# Patient Record
Sex: FEMALE | Race: WHITE | Employment: OTHER | URBAN - METROPOLITAN AREA
[De-identification: names, ages, dates, MRNs, and addresses within clinical notes are randomized per-mention and may not be internally consistent; named-entity substitution may affect disease eponyms.]

---

## 2021-02-12 ENCOUNTER — TRANSCRIBE ORDER (OUTPATIENT)
Dept: REGISTRATION | Age: 76
End: 2021-02-12

## 2021-02-12 ENCOUNTER — HOSPITAL ENCOUNTER (OUTPATIENT)
Dept: PREADMISSION TESTING | Age: 76
Discharge: HOME | End: 2021-02-12
Payer: MEDICARE

## 2021-02-12 DIAGNOSIS — Z01.812 PRE-PROCEDURE LAB EXAM: Primary | ICD-10-CM

## 2021-02-12 DIAGNOSIS — Z01.812 PRE-PROCEDURE LAB EXAM: ICD-10-CM

## 2021-02-12 PROCEDURE — U0003 INFECTIOUS AGENT DETECTION BY NUCLEIC ACID (DNA OR RNA); SEVERE ACUTE RESPIRATORY SYNDROME CORONAVIRUS 2 (SARS-COV-2) (CORONAVIRUS DISEASE [COVID-19]), AMPLIFIED PROBE TECHNIQUE, MAKING USE OF HIGH THROUGHPUT TECHNOLOGIES AS DESCRIBED BY CMS-2020-01-R: HCPCS

## 2021-02-13 LAB — SARS-COV-2, COV2NT: NOT DETECTED

## 2021-02-19 ENCOUNTER — HOSPITAL ENCOUNTER (OUTPATIENT)
Age: 76
Setting detail: OUTPATIENT SURGERY
Discharge: HOME | End: 2021-02-19
Attending: SPECIALIST | Admitting: SPECIALIST
Payer: MEDICARE

## 2021-02-19 ENCOUNTER — ANESTHESIA (OUTPATIENT)
Dept: ENDOSCOPY | Age: 76
End: 2021-02-19
Payer: MEDICARE

## 2021-02-19 ENCOUNTER — ANESTHESIA EVENT (OUTPATIENT)
Dept: ENDOSCOPY | Age: 76
End: 2021-02-19
Payer: MEDICARE

## 2021-02-19 VITALS
HEART RATE: 57 BPM | RESPIRATION RATE: 14 BRPM | TEMPERATURE: 98.7 F | BODY MASS INDEX: 22.08 KG/M2 | SYSTOLIC BLOOD PRESSURE: 128 MMHG | DIASTOLIC BLOOD PRESSURE: 68 MMHG | HEIGHT: 62 IN | OXYGEN SATURATION: 100 % | WEIGHT: 120 LBS

## 2021-02-19 PROCEDURE — 74011250636 HC RX REV CODE- 250/636: Performed by: SPECIALIST

## 2021-02-19 PROCEDURE — 74011000250 HC RX REV CODE- 250: Performed by: NURSE ANESTHETIST, CERTIFIED REGISTERED

## 2021-02-19 PROCEDURE — 77030026438 HC STYL ET INTUB CARD -A: Performed by: ANESTHESIOLOGY

## 2021-02-19 PROCEDURE — 88305 TISSUE EXAM BY PATHOLOGIST: CPT

## 2021-02-19 PROCEDURE — 76040000019: Performed by: SPECIALIST

## 2021-02-19 PROCEDURE — 77030021593 HC FCPS BIOP ENDOSC BSC -A: Performed by: SPECIALIST

## 2021-02-19 PROCEDURE — 74011250636 HC RX REV CODE- 250/636: Performed by: NURSE ANESTHETIST, CERTIFIED REGISTERED

## 2021-02-19 PROCEDURE — 77030008684 HC TU ET CUF COVD -B: Performed by: ANESTHESIOLOGY

## 2021-02-19 PROCEDURE — 76060000031 HC ANESTHESIA FIRST 0.5 HR: Performed by: SPECIALIST

## 2021-02-19 RX ORDER — CALCIUM CARBONATE 500(1250)
TABLET ORAL DAILY
COMMUNITY

## 2021-02-19 RX ORDER — ROSUVASTATIN CALCIUM 20 MG/1
20 TABLET, COATED ORAL
COMMUNITY

## 2021-02-19 RX ORDER — TIMOLOL MALEATE 5 MG/ML
1 SOLUTION OPHTHALMIC DAILY
COMMUNITY
End: 2022-08-02 | Stop reason: DRUGHIGH

## 2021-02-19 RX ORDER — DEXTROMETHORPHAN/PSEUDOEPHED 2.5-7.5/.8
1.2 DROPS ORAL
Status: DISCONTINUED | OUTPATIENT
Start: 2021-02-19 | End: 2021-02-19 | Stop reason: HOSPADM

## 2021-02-19 RX ORDER — SODIUM CHLORIDE 0.9 % (FLUSH) 0.9 %
5-40 SYRINGE (ML) INJECTION AS NEEDED
Status: DISCONTINUED | OUTPATIENT
Start: 2021-02-19 | End: 2021-02-19 | Stop reason: HOSPADM

## 2021-02-19 RX ORDER — FLUMAZENIL 0.1 MG/ML
0.2 INJECTION INTRAVENOUS
Status: DISCONTINUED | OUTPATIENT
Start: 2021-02-19 | End: 2021-02-19 | Stop reason: HOSPADM

## 2021-02-19 RX ORDER — NALOXONE HYDROCHLORIDE 0.4 MG/ML
0.4 INJECTION, SOLUTION INTRAMUSCULAR; INTRAVENOUS; SUBCUTANEOUS
Status: DISCONTINUED | OUTPATIENT
Start: 2021-02-19 | End: 2021-02-19 | Stop reason: HOSPADM

## 2021-02-19 RX ORDER — LATANOPROST 50 UG/ML
1 SOLUTION/ DROPS OPHTHALMIC
COMMUNITY

## 2021-02-19 RX ORDER — SODIUM CHLORIDE 0.9 % (FLUSH) 0.9 %
5-40 SYRINGE (ML) INJECTION EVERY 8 HOURS
Status: DISCONTINUED | OUTPATIENT
Start: 2021-02-19 | End: 2021-02-19 | Stop reason: HOSPADM

## 2021-02-19 RX ORDER — LIDOCAINE HYDROCHLORIDE 20 MG/ML
INJECTION, SOLUTION EPIDURAL; INFILTRATION; INTRACAUDAL; PERINEURAL AS NEEDED
Status: DISCONTINUED | OUTPATIENT
Start: 2021-02-19 | End: 2021-02-19 | Stop reason: HOSPADM

## 2021-02-19 RX ORDER — SODIUM CHLORIDE 9 MG/ML
50 INJECTION, SOLUTION INTRAVENOUS CONTINUOUS
Status: DISCONTINUED | OUTPATIENT
Start: 2021-02-19 | End: 2021-02-19 | Stop reason: HOSPADM

## 2021-02-19 RX ORDER — EPINEPHRINE 0.1 MG/ML
1 INJECTION INTRACARDIAC; INTRAVENOUS
Status: DISCONTINUED | OUTPATIENT
Start: 2021-02-19 | End: 2021-02-19 | Stop reason: HOSPADM

## 2021-02-19 RX ORDER — ATROPINE SULFATE 0.1 MG/ML
0.5 INJECTION INTRAVENOUS
Status: DISCONTINUED | OUTPATIENT
Start: 2021-02-19 | End: 2021-02-19 | Stop reason: HOSPADM

## 2021-02-19 RX ORDER — PROPOFOL 10 MG/ML
INJECTION, EMULSION INTRAVENOUS AS NEEDED
Status: DISCONTINUED | OUTPATIENT
Start: 2021-02-19 | End: 2021-02-19 | Stop reason: HOSPADM

## 2021-02-19 RX ADMIN — PROPOFOL 30 MG: 10 INJECTION, EMULSION INTRAVENOUS at 13:37

## 2021-02-19 RX ADMIN — PROPOFOL 30 MG: 10 INJECTION, EMULSION INTRAVENOUS at 13:31

## 2021-02-19 RX ADMIN — PROPOFOL 100 MG: 10 INJECTION, EMULSION INTRAVENOUS at 13:22

## 2021-02-19 RX ADMIN — PROPOFOL 30 MG: 10 INJECTION, EMULSION INTRAVENOUS at 13:28

## 2021-02-19 RX ADMIN — PROPOFOL 30 MG: 10 INJECTION, EMULSION INTRAVENOUS at 13:25

## 2021-02-19 RX ADMIN — PROPOFOL 30 MG: 10 INJECTION, EMULSION INTRAVENOUS at 13:34

## 2021-02-19 RX ADMIN — LIDOCAINE HYDROCHLORIDE 50 MG: 20 INJECTION, SOLUTION EPIDURAL; INFILTRATION; INTRACAUDAL; PERINEURAL at 13:22

## 2021-02-19 RX ADMIN — PROPOFOL 30 MG: 10 INJECTION, EMULSION INTRAVENOUS at 13:23

## 2021-02-19 NOTE — ROUTINE PROCESS
Gaurang Gupta  1945  [id number]    Situation:  Verbal report received from: Diane  Procedure: Procedure(s):  COLONOSCOPY  COLON BIOPSY    Background:    Preoperative diagnosis: Colon Cancer Screen  Postoperative diagnosis: 1. Transverse Colon Polyp  2.  Hemorrhoids    :  Dr. Tori Ortiz  Assistant(s): Endoscopy Technician-1: Velvet Martin  Endoscopy RN-1: Siddharth Smyth    Specimens:   ID Type Source Tests Collected by Time Destination   1 : Transverse Colon Polyp Preservative Colon, Transverse  Katelyn Kennedy MD 2/19/2021 1333 Pathology     H. Pylori  no    Assessment:  Intra-procedure medications   Anesthesia gave intra-procedure sedation and medications, see anesthesia flow sheet     Intravenous fluids: NS@ KVO     Vital signs stable     Abdominal assessment: round and soft    Recommendation:  Discharge patient per MD order  Family -yes  Permission to share finding with family -yes

## 2021-02-19 NOTE — ANESTHESIA PREPROCEDURE EVALUATION
Relevant Problems   No relevant active problems       Anesthetic History   No history of anesthetic complications            Review of Systems / Medical History  Patient summary reviewed, nursing notes reviewed and pertinent labs reviewed    Pulmonary  Within defined limits                 Neuro/Psych   Within defined limits           Cardiovascular  Within defined limits                Exercise tolerance: >4 METS     GI/Hepatic/Renal  Within defined limits              Endo/Other  Within defined limits           Other Findings              Physical Exam    Airway  Mallampati: II  TM Distance: > 6 cm  Neck ROM: normal range of motion   Mouth opening: Normal     Cardiovascular  Regular rate and rhythm,  S1 and S2 normal,  no murmur, click, rub, or gallop             Dental  No notable dental hx       Pulmonary  Breath sounds clear to auscultation               Abdominal  GI exam deferred       Other Findings            Anesthetic Plan    ASA: 1  Anesthesia type: MAC          Induction: Intravenous  Anesthetic plan and risks discussed with: Patient

## 2021-02-19 NOTE — PROCEDURES
[address], [address] [place] [address]                 Colonoscopy Procedure Note    Indications:   See Preoperative Diagnosis above  Referring Physician: None  Anesthesia/Sedation: MAC anesthesia Propofol  Endoscopist:  Dr. Aracelis Fine  Assistant:  Endoscopy Technician-1: Amanda Moreau IV  Endoscopy RN-1: Brian Moss  Preoperative diagnosis: Colon Cancer Screen  Postoperative diagnosis: 1. Transverse Colon Polyp  2. Hemorrhoids    Procedure in Detail:  Informed consent was obtained for the procedure, including sedation. Risks of perforation, hemorrhage, adverse drug reaction, and aspiration were discussed. The patient was placed in the left lateral decubitus position. Based on the pre-procedure assessment, including review of the patient's medical history, medications, allergies, and review of systems, she had been deemed to be an appropriate candidate for moderate sedation; she was therefore sedated with the medications listed above. The patient was monitored continuously with ECG tracing, pulse oximetry, blood pressure monitoring, and direct observations. A rectal examination was performed. The [id number] was inserted into the rectum and advanced under direct vision to the cecum, which was identified by the ileocecal valve and appendiceal orifice. The quality of the colonic preparation was good. A careful inspection was made as the colonoscope was withdrawn, including a retroflexed view of the rectum; findings and interventions are described below. Appropriate photodocumentation was obtained. Findings:  Rectum: normal  Sigmoid: normal  Descending Colon: normal  Transverse Colon: 3 mm polyp removed with cold biopsy  Ascending Colon: normal  Cecum: normal      Specimens:    colon polyp    EBL: None    Complications: None; patient tolerated the procedure well. Recommendations:     - Await pathology.     - If adenoma is present, repeat colonoscopy in 5 years.        Signed By: Hilary Lara MD                        February 19, 2021

## 2021-02-19 NOTE — H&P
Colonoscopy History and Physical      The patient was seen and examined. Date of last colonoscopy: 2010, Polyps  No      The airway was assessed and documented. The problem list, past medical history, and medications were reviewed. There is no problem list on file for this patient. Social History     Socioeconomic History    Marital status: SINGLE     Spouse name: Not on file    Number of children: Not on file    Years of education: Not on file    Highest education level: Not on file   Occupational History    Not on file   Social Needs    Financial resource strain: Not on file    Food insecurity     Worry: Not on file     Inability: Not on file    Transportation needs     Medical: Not on file     Non-medical: Not on file   Tobacco Use    Smoking status: Never Smoker    Smokeless tobacco: Never Used   Substance and Sexual Activity    Alcohol use: Never     Frequency: Never    Drug use: Never    Sexual activity: Not on file   Lifestyle    Physical activity     Days per week: Not on file     Minutes per session: Not on file    Stress: Not on file   Relationships    Social connections     Talks on phone: Not on file     Gets together: Not on file     Attends [employer] service: Not on file     Active member of club or organization: Not on file     Attends meetings of clubs or organizations: Not on file     Relationship status: Not on file    Intimate partner violence     Fear of current or ex partner: Not on file     Emotionally abused: Not on file     Physically abused: Not on file     Forced sexual activity: Not on file   Other Topics Concern    Not on file   Social History Narrative    Not on file     History reviewed. No pertinent past medical history. Prior to Admission Medications   Prescriptions Last Dose Informant Patient Reported? Taking?   calcium carbonate (OS-EDOUARD) 500 mg calcium (1,250 mg) tablet 2/12/2021 at Unknown time  Yes Yes   Sig: Take  by mouth daily.    latanoprost (XALATAN) 0.005 % ophthalmic solution 2/18/2021 at Unknown time  Yes Yes   Sig: Administer 1 Drop to both eyes nightly. loratadine 10 mg cap 2/18/2021 at Unknown time  Yes Yes   Sig: Take  by mouth. rosuvastatin (CRESTOR) 20 mg tablet 2/18/2021 at Unknown time  Yes Yes   Sig: Take 20 mg by mouth nightly. timolol (TIMOPTIC-XE) 0.5 % ophthalmic gel-forming 2/19/2021 at Unknown time  Yes Yes   Sig: Administer 1 Drop to both eyes daily. Facility-Administered Medications: None       The patient was seen and examined in the endoscopy suite. The airway was assessed and documented. The problem list and medications were reviewed. Chief complaint, history of present illness, and review of systems and Past medical History are positive for: colon cancer screening    The heart, lungs and mental status were satisfactory for the administration of sedation and for the procedure. I discussed with the patient the objectives, risks, consequences and alternatives to the procedure. The patient was counseled at length about the risks of karen Covid-19 in the cheryl-operative and post-operative states including the recovery window of their procedure. The patient was made aware that karen Covid-19 after a surgical procedure may worsen their prognosis for recovering from the virus and lend to a higher morbidity and or mortality risk. The patient was given the options of postponing their procedure. All of the risks, benefits, and alternatives were discussed. The patient does  wish to proceed with the procedure.     Plan: Endoscopic procedure with sedation     Ángel Swann MD   2/19/2021  1:17 PM

## 2021-02-19 NOTE — DISCHARGE INSTRUCTIONS
Mario Thai  [id number]  1945    COLON DISCHARGE INSTRUCTIONS  Discomfort:  Redness at IV site- apply warm compress to area; if redness or soreness persist- contact your physician  There may be a slight amount of blood passed from the rectum  Gaseous discomfort- walking, belching will help relieve any discomfort    DIET:   Regular diet. - however -  remember your colon is empty and a heavy meal will produce gas. Avoid these foods:  vegetables, fried / greasy foods, carbonated drinks for today. You may not drink alcoholic beverages for at least 12 hours    MEDICATIONS:   Regarding Aspirin or Nonsteroidal medications, please see below. ACTIVITY:  You may resume your normal daily activities it is recommended that you spend the remainder of the day resting -  avoid any strenuous activity. You may not operate a vehicle for 12 hours  You may not engage in an occupation involving machinery or appliances for rest of today  Avoid making any critical decisions for at least 24 hour    CALL M.D. ANY SIGN OF:  Increasing pain, nausea, vomiting  Abdominal distension (swelling)  New increased bleeding (oral or rectal)  Fever (chills)  Pain in chest area  Bloody discharge from nose or mouth  Shortness of breath    You may not  take any Advil, Aspirin, Ibuprofen, Motrin, Aleve, or Goodys for 10 days, ONLY  Tylenol as needed for pain. Post procedure diagnosis: 1. Transverse Colon Polyp  2. Hemorrhoids      Follow-up Instructions:   Call Dr. Joe Hall  Results of procedure / biopsy in 10 days  Telephone #  [phone]      DISCHARGE SUMMARY from Nurse    The following personal items collected during your admission are returned to you:   Dental Appliance: Dental Appliances: None  Vision: Visual Aid: None  Hearing Aid:    Jewelry:    Clothing:    Other Valuables:    Valuables sent to safe:      Learning About Coronavirus (COVID-19)  Coronavirus (COVID-19): Overview  What is coronavirus (RINCB-14)?   The coronavirus disease (COVID-19) is caused by a virus. It is an illness that was first found in [place], [place], in December 2019. It has since spread worldwide. The virus can cause fever, cough, and trouble breathing. In severe cases, it can cause pneumonia and make it hard to breathe without help. It can cause death. Coronaviruses are a large group of viruses. They cause the common cold. They also cause more serious illnesses like Middle East respiratory syndrome (MERS) and severe acute respiratory syndrome (SARS). COVID-19 is caused by a novel coronavirus. That means it's a new type that has not been seen in people before. This virus spreads person-to-person through droplets from coughing and sneezing. It can also spread when you are close to someone who is infected. And it can spread when you touch something that has the virus on it, such as a doorknob or a tabletop. What can you do to protect yourself from coronavirus (COVID-19)? The best way to protect yourself from getting sick is to:  · Avoid areas where there is an outbreak. · Avoid contact with people who may be infected. · Wash your hands often with soap or alcohol-based hand sanitizers. · Avoid crowds and try to stay at least 6 feet away from other people. · Wash your hands often, especially after you cough or sneeze. Use soap and water, and scrub for at least 20 seconds. If soap and water aren't available, use an alcohol-based hand . · Avoid touching your mouth, nose, and eyes. What can you do to avoid spreading the virus to others? To help avoid spreading the virus to others:  · Cover your mouth with a tissue when you cough or sneeze. Then throw the tissue in the trash. · Use a disinfectant to clean things that you touch often. · Stay home if you are sick or have been exposed to the virus. Don't go to school, work, or public areas. And don't use public transportation. · If you are sick:  ? Leave your home only if you need to get medical care. But call the doctor's office first so they know you're coming. And wear a face mask, if you have one.  ? If you have a face mask, wear it whenever you're around other people. It can help stop the spread of the virus when you cough or sneeze. ? Clean and disinfect your home every day. Use household  and disinfectant wipes or sprays. Take special care to clean things that you grab with your hands. These include doorknobs, remote controls, phones, and handles on your refrigerator and microwave. And don't forget countertops, tabletops, bathrooms, and computer keyboards. When to call for help  Call 911 anytime you think you may need emergency care. For example, call if:  · You have severe trouble breathing. (You can't talk at all.)  · You have constant chest pain or pressure. · You are severely dizzy or lightheaded. · You are confused or can't think clearly. · Your face and lips have a blue color. · You pass out (lose consciousness) or are very hard to wake up. Call your doctor now if you develop symptoms such as:  · Shortness of breath. · Fever. · Cough. If you need to get care, call ahead to the doctor's office for instructions before you go. Make sure you wear a face mask, if you have one, to prevent exposing other people to the virus. Where can you get the latest information? The following health organizations are tracking and studying this virus. Their websites contain the most up-to-date information. Meir Metz also learn what to do if you think you may have been exposed to the virus. · U.S. Centers for Disease Control and Prevention (CDC): The CDC provides updated news about the disease and travel advice. The website also tells you how to prevent the spread of infection. www.cdc.gov  · World Health Organization [facility]): WHO offers information about the virus outbreaks.  WHO also has travel advice. www.who.int  Current as of: April 1, 2020               Content Version: 12.4  © 5061-0954 [employer], [employer]orporated. Care instructions adapted under license by your healthcare professional. If you have questions about a medical condition or this instruction, always ask your healthcare professional. [address] any warranty or liability for your use of this information.

## 2021-06-03 ENCOUNTER — OFFICE VISIT (OUTPATIENT)
Dept: NEUROLOGY | Age: 76
End: 2021-06-03
Payer: MEDICARE

## 2021-06-03 VITALS
RESPIRATION RATE: 16 BRPM | DIASTOLIC BLOOD PRESSURE: 60 MMHG | HEART RATE: 64 BPM | BODY MASS INDEX: 20.52 KG/M2 | SYSTOLIC BLOOD PRESSURE: 138 MMHG | HEIGHT: 62 IN | OXYGEN SATURATION: 94 % | WEIGHT: 111.5 LBS

## 2021-06-03 DIAGNOSIS — G45.9 TIA (TRANSIENT ISCHEMIC ATTACK): Primary | ICD-10-CM

## 2021-06-03 PROCEDURE — G8432 DEP SCR NOT DOC, RNG: HCPCS | Performed by: SPECIALIST

## 2021-06-03 PROCEDURE — G8427 DOCREV CUR MEDS BY ELIG CLIN: HCPCS | Performed by: SPECIALIST

## 2021-06-03 PROCEDURE — 3017F COLORECTAL CA SCREEN DOC REV: CPT | Performed by: SPECIALIST

## 2021-06-03 PROCEDURE — G8420 CALC BMI NORM PARAMETERS: HCPCS | Performed by: SPECIALIST

## 2021-06-03 PROCEDURE — G8536 NO DOC ELDER MAL SCRN: HCPCS | Performed by: SPECIALIST

## 2021-06-03 PROCEDURE — 1090F PRES/ABSN URINE INCON ASSESS: CPT | Performed by: SPECIALIST

## 2021-06-03 PROCEDURE — 1101F PT FALLS ASSESS-DOCD LE1/YR: CPT | Performed by: SPECIALIST

## 2021-06-03 PROCEDURE — 99204 OFFICE O/P NEW MOD 45 MIN: CPT | Performed by: SPECIALIST

## 2021-06-03 PROCEDURE — G8400 PT W/DXA NO RESULTS DOC: HCPCS | Performed by: SPECIALIST

## 2021-06-03 RX ORDER — ASPIRIN 81 MG/1
TABLET ORAL DAILY
COMMUNITY

## 2021-06-03 RX ORDER — ALENDRONATE SODIUM 70 MG/1
TABLET ORAL
COMMUNITY

## 2021-06-03 RX ORDER — BISMUTH SUBSALICYLATE 262 MG
1 TABLET,CHEWABLE ORAL DAILY
COMMUNITY

## 2021-06-03 NOTE — PROGRESS NOTES
Ms. Juarez Laser presents today to follow up two episodes of visual disturbance. Depression screening done on this patient.

## 2021-06-03 NOTE — PROGRESS NOTES
Neurology Consult      Subjective:      Kavin Peña is a 70 y.o. female who comes in with daughter today. An interesting story line that includes 2 different occasions of sudden impairment of cognition and/or vision etc.  1 night was at dinner with friends and had difficulty reading the menu and felt cognitively detached. This lasted for 5 or 7 minutes and cleared. Had no occasion of headache at that time although she has had milder headaches in the past.  Nothing that would apparently equate to a migraine for what it is worth. Then days later was at home watching TV and suddenly saw a round [place] that was not transparent a could not see through it. Did not do an independent eye cover and after 2 to 3 minutes it cleared. Again no concomitant headache or other symptoms that she can recall and has done well since. Saw Dr. Linda Marsh the ophthalmologist and apparently turned in a normal exam.  Has been on baby aspirin but notices some bruisability so takes it every other day. No background history of hypertension diabetes organic heart disease does not smoke and consumes alcohol occasionally. Is on Crestor for I presume hyperlipidemia. Current Outpatient Medications   Medication Sig Dispense Refill    multivitamin (ONE A DAY) tablet Take 1 Tablet by mouth daily.  aspirin delayed-release 81 mg tablet Take  by mouth daily.  alendronate (FOSAMAX) 70 mg tablet Take  by mouth.  timolol (TIMOPTIC-XE) 0.5 % ophthalmic gel-forming Administer 1 Drop to both eyes daily.  rosuvastatin (CRESTOR) 20 mg tablet Take 20 mg by mouth nightly.  loratadine 10 mg cap Take  by mouth.  latanoprost (XALATAN) 0.005 % ophthalmic solution Administer 1 Drop to both eyes nightly.  calcium carbonate (OS-EDOUARD) 500 mg calcium (1,250 mg) tablet Take  by mouth daily. No Known Allergies  History reviewed. No pertinent past medical history.    Past Surgical History:   Procedure Laterality Date    COLONOSCOPY Left 2/19/2021    COLONOSCOPY performed by [facility]ika Madrigal MD at [facility] ENDOSCOPY    HX HYSTERECTOMY        Social History     Socioeconomic History    Marital status: SINGLE     Spouse name: Not on file    Number of children: Not on file    Years of education: Not on file    Highest education level: Not on file   Occupational History    Not on file   Tobacco Use    Smoking status: Never Smoker    Smokeless tobacco: Never Used   Substance and Sexual Activity    Alcohol use: Never    Drug use: Never    Sexual activity: Not on file   Other Topics Concern    Not on file   Social History Narrative    Not on file     Social Determinants of Health     Financial Resource Strain:     Difficulty of Paying Living Expenses:    Food Insecurity:     Worried About Running Out of Food in the Last Year:     [address] in the Last Year:    Transportation Needs:     Lack of Transportation (Medical):  Lack of Transportation (Non-Medical):    Physical Activity:     Days of Exercise per Week:     Minutes of Exercise per Session:    Stress:     Feeling of Stress :    Social Connections:     Frequency of Communication with Friends and Family:     Frequency of Social Gatherings with Friends and Family:     Attends Congregation Services:     Active Member of Clubs or Organizations:     Attends Club or Organization Meetings:     Marital Status:    Intimate Partner Violence:     Fear of Current or Ex-Partner:     Emotionally Abused:     Physically Abused:     Sexually Abused:       History reviewed. No pertinent family history. Visit Vitals  /60   Pulse 64   Resp 16   Ht 5' 2\" (1.575 m)   Wt 50.6 kg (111 lb 8 oz)   SpO2 94%   BMI 20.39 kg/m²        Review of Systems:   A comprehensive review of systems was negative except for that written in the HPI. Neuro Exam:     Appearance:   The patient is well developed, well nourished, provides a coherent history and is in no acute distress. Mental Status: Oriented to time, place and person. Mood and affect appropriate. Cranial Nerves:   Intact visual fields to static hand confrontation. Fundi are benign. BELLO, EOM's full, no nystagmus, no ptosis. Facial sensation is normal. Corneal reflexes are intact. Facial movement is symmetric. Hearing is normal bilaterally. Palate is midline with normal sternocleidomastoid and trapezius muscles are normal. Tongue is midline. Visual acuity near with correction 20/20 OU APD negative   Motor:  5/5 strength in upper and lower proximal and distal muscles. Normal bulk and tone. No fasciculations. Reflexes:   Deep tendon reflexes 2+/4 and symmetrical.   Sensory:   Normal to touch, pinprick and vibration. Gait:  Normal gait. Tremor:   No tremor noted. Cerebellar:  No cerebellar signs present. Neurovascular:  Normal heart sounds and regular rhythm, peripheral pulses intact, and no carotid bruits. Palpation of superficial temporal arteries objectively normal and subjectively nontender            Assessment:   TIA. I do have concerns about these 2 separate events and their implications as might go to an embolic source either cardiac or intrinsic to the vessels. Has been told if she has another one event she should seriously consider going to the ER. In the occasion she has a visual impairment if she is safe to do so, an independent eye cover and determine whether it is 1 or both eyes involved, that would be helpful. Has already seen the ophthalmologist and that helps tremendously. Continue the aspirin and further suggestions could follow. Testing will include select labs MRA of head and neck brain MRI and echocardiogram.      Plan:   Revisit in about 5 weeks or so.   Signed by :  Piotr Parry MD

## 2021-06-03 NOTE — PATIENT INSTRUCTIONS
PRESCRIPTION REFILL POLICY [facility] Statement to Patients April 1, 2014 In an effort to ensure the large volume of patient prescription refills is processed in the most efficient and expeditious manner, we are asking our patients to assist us by calling your Pharmacy for all prescription refills, this will include also your  Mail Order Pharmacy. The pharmacy will contact our office electronically to continue the refill process. Please do not wait until the last minute to call your pharmacy. We need at least 48 hours (2days) to fill prescriptions. We also encourage you to call your pharmacy before going to  your prescription to make sure it is ready. With regard to controlled substance prescription refill requests (narcotic refills) that need to be picked up at our office, we ask your cooperation by providing us with at least 72 hours (3days) notice that you will need a refill. We will not refill narcotic prescription refill requests after 4:00pm on any weekday, Monday through Thursday, or after 2:00pm on Fridays, or on the weekends. We encourage everyone to explore another way of getting your prescription refill request processed using [employer], our patient web portal through our electronic medical record system. [employer] is an efficient and effective way to communicate your medication request directly to the office and  downloadable as an vivian on your smart phone . [employer] also features a review functionality that allows you to view your medication list as well as leave messages for your physician. Are you ready to get connected? If so please review the attatched instructions or speak to any of our staff to get you set up right away! Thank you so much for your cooperation. Should you have any questions please contact our Practice Administrator. The Physicians and Staff,  [facility] Patient history reviewed patient examined.   Agree with overall concerns and to that end would recommend continuation of aspirin and if another episode occurred would seriously consider an ER visitation. Went over the occasion of any visual happenings and doing an independent eye cover and timing the event. Will order testing that reflects concerns for possible TIA like expression and will catch up in the near future. I am glad to see that the eye doctor his already evaluated and that helps a lot.

## 2021-06-04 LAB
CRP SERPL HS-MCNC: 0.37 MG/L (ref 0–3)
ERYTHROCYTE [SEDIMENTATION RATE] IN BLOOD BY WESTERGREN METHOD: 11 MM/HR (ref 0–40)

## 2021-06-17 ENCOUNTER — APPOINTMENT (OUTPATIENT)
Dept: MRI IMAGING | Age: 76
End: 2021-06-17
Payer: MEDICARE

## 2021-06-17 ENCOUNTER — HOSPITAL ENCOUNTER (OUTPATIENT)
Dept: MRI IMAGING | Age: 76
Discharge: HOME | End: 2021-06-17
Payer: MEDICARE

## 2021-06-17 ENCOUNTER — HOSPITAL ENCOUNTER (OUTPATIENT)
Dept: NON INVASIVE DIAGNOSTICS | Age: 76
Discharge: HOME | End: 2021-06-17
Payer: MEDICARE

## 2021-06-17 VITALS
WEIGHT: 111.55 LBS | DIASTOLIC BLOOD PRESSURE: 78 MMHG | BODY MASS INDEX: 20.53 KG/M2 | HEIGHT: 62 IN | SYSTOLIC BLOOD PRESSURE: 118 MMHG

## 2021-06-17 DIAGNOSIS — G45.9 TIA (TRANSIENT ISCHEMIC ATTACK): ICD-10-CM

## 2021-06-17 LAB
ECHO AO ROOT DIAM: 2.9 CM
ECHO AR MAX VEL PISA: 428 CM/S
ECHO AV PEAK GRADIENT: 11 MMHG
ECHO AV PEAK VELOCITY: 165 CM/S
ECHO AV REGURGITANT PHT: 645 MS
ECHO EST RA PRESSURE: 3 MMHG
ECHO LA AREA 4C: 10.57 CM2
ECHO LA MAJOR AXIS: 2.8 CM
ECHO LA MINOR AXIS: 1.88 CM
ECHO LV E' SEPTAL VELOCITY: 9.75 CM/S
ECHO LV EDV A2C: 82.9 CM3
ECHO LV EJECTION FRACTION BIPLANE: 67.9 % (ref 55–100)
ECHO LV ESV A2C: 20.1 CM3
ECHO LV INTERNAL DIMENSION DIASTOLIC: 4.36 CM (ref 3.9–5.3)
ECHO LV INTERNAL DIMENSION SYSTOLIC: 2.72 CM
ECHO LV IVSD: 0.97 CM (ref 0.6–0.9)
ECHO LV MASS 2D: 128 G (ref 67–162)
ECHO LV MASS INDEX 2D: 85.9 G/M2 (ref 43–95)
ECHO LV POSTERIOR WALL DIASTOLIC: 0.85 CM (ref 0.6–0.9)
ECHO LVOT PEAK GRADIENT: 8 MMHG
ECHO LVOT PEAK VELOCITY: 138 CM/S
ECHO MV A VELOCITY: 113 CM/S
ECHO MV AREA PHT: 4.78 CM2
ECHO MV E DECELERATION TIME (DT): 268 MS
ECHO MV E VELOCITY: 100 CM/S
ECHO MV E/A RATIO: 0.88
ECHO MV E/E' SEPTAL: 10.26
ECHO MV PRESSURE HALF TIME (PHT): 46 MS
ECHO PV MAX VELOCITY: 103 CM/S
ECHO PV PEAK INSTANTANEOUS GRADIENT SYSTOLIC: 4 MMHG
ECHO PVEIN A DURATION: 70 MS
ECHO PVEIN A VELOCITY: 36.2 CM/S
ECHO RA AREA 4C: 10.2 CM2
ECHO RIGHT VENTRICULAR SYSTOLIC PRESSURE (RVSP): 22 MMHG
ECHO RV INTERNAL DIMENSION: 2.24 CM
ECHO TV MAX VELOCITY: 219 CM/S
ECHO TV REGURGITANT PEAK GRADIENT: 19 MMHG
MV DEC SLOPE: 7030 MM/S2
MV DEC SLOPE: 7030 MM/S2

## 2021-06-17 PROCEDURE — 96374 THER/PROPH/DIAG INJ IV PUSH: CPT

## 2021-06-17 PROCEDURE — 70544 MR ANGIOGRAPHY HEAD W/O DYE: CPT

## 2021-06-17 PROCEDURE — 70551 MRI BRAIN STEM W/O DYE: CPT

## 2021-06-17 PROCEDURE — 70547 MR ANGIOGRAPHY NECK W/O DYE: CPT

## 2021-07-15 ENCOUNTER — OFFICE VISIT (OUTPATIENT)
Dept: NEUROLOGY | Age: 76
End: 2021-07-15
Payer: MEDICARE

## 2021-07-15 DIAGNOSIS — G45.9 TIA (TRANSIENT ISCHEMIC ATTACK): Primary | ICD-10-CM

## 2021-07-15 PROCEDURE — 1101F PT FALLS ASSESS-DOCD LE1/YR: CPT | Performed by: SPECIALIST

## 2021-07-15 PROCEDURE — G8400 PT W/DXA NO RESULTS DOC: HCPCS | Performed by: SPECIALIST

## 2021-07-15 PROCEDURE — 3017F COLORECTAL CA SCREEN DOC REV: CPT | Performed by: SPECIALIST

## 2021-07-15 PROCEDURE — G8420 CALC BMI NORM PARAMETERS: HCPCS | Performed by: SPECIALIST

## 2021-07-15 PROCEDURE — G8427 DOCREV CUR MEDS BY ELIG CLIN: HCPCS | Performed by: SPECIALIST

## 2021-07-15 PROCEDURE — G8536 NO DOC ELDER MAL SCRN: HCPCS | Performed by: SPECIALIST

## 2021-07-15 PROCEDURE — G8432 DEP SCR NOT DOC, RNG: HCPCS | Performed by: SPECIALIST

## 2021-07-15 PROCEDURE — 99213 OFFICE O/P EST LOW 20 MIN: CPT | Performed by: SPECIALIST

## 2021-07-15 PROCEDURE — 1090F PRES/ABSN URINE INCON ASSESS: CPT | Performed by: SPECIALIST

## 2021-07-15 NOTE — LETTER
7/15/2021    Patient: Fito Mcdonough   YOB: 1945   Date of Visit: 7/15/2021     Ran Walls, [address] [facility] [address]  Via Fax: [phone]    Dear Ran Walls DO,      Thank you for referring Ms. Titi Poon to [facility] for evaluation. My notes for this consultation are attached. If you have questions, please do not hesitate to call me. I look forward to following your patient along with you.       Sincerely,    Akhil Swann MD

## 2021-07-15 NOTE — PROGRESS NOTES
Neurology Consult      Subjective:      Liane Beard is a 70 y.o. female who comes in today with family and this is the second visit work-up of suspect TIA. By way of recall on the first visit there was some concerning history that included some altered mentation and acute confusion at a dinner gathering and later a visual happening of transient nature while watching TV. The work-up that followed included a normal sed rate and CRP normal brain MRA and MRA of the neck as well. Brain MRI showed mild white matter changes and paranasal congestion only. The echocardiogram showed an ejection fraction of 68% and mild aortic mitral and tricuspid regurgitation and with agitated saline no atrial septal defect demonstrated. Patient was very intrigued by the different test she was put through and is even happier now to hear the results. I would suggest that she continue on the every other day baby aspirin to her tolerance and continue follow-ups with primary care as she is on Crestor etc.  Knows where to find me if something changes but as of today does not reference any new difficulties or concerns. Current Outpatient Medications   Medication Sig Dispense Refill    multivitamin (ONE A DAY) tablet Take 1 Tablet by mouth daily.  aspirin delayed-release 81 mg tablet Take  by mouth daily.  alendronate (FOSAMAX) 70 mg tablet Take  by mouth.  timolol (TIMOPTIC-XE) 0.5 % ophthalmic gel-forming Administer 1 Drop to both eyes daily.  rosuvastatin (CRESTOR) 20 mg tablet Take 20 mg by mouth nightly.  loratadine 10 mg cap Take  by mouth.  latanoprost (XALATAN) 0.005 % ophthalmic solution Administer 1 Drop to both eyes nightly.  calcium carbonate (OS-EDOUARD) 500 mg calcium (1,250 mg) tablet Take  by mouth daily. No Known Allergies  No past medical history on file.    Past Surgical History:   Procedure Laterality Date    COLONOSCOPY Left 2/19/2021    COLONOSCOPY performed by Faby Nation Kamini Solis MD at [facility] ENDOSCOPY    HX HYSTERECTOMY        Social History     Socioeconomic History    Marital status: SINGLE     Spouse name: Not on file    Number of children: Not on file    Years of education: Not on file    Highest education level: Not on file   Occupational History    Not on file   Tobacco Use    Smoking status: Never Smoker    Smokeless tobacco: Never Used   Substance and Sexual Activity    Alcohol use: Never    Drug use: Never    Sexual activity: Not on file   Other Topics Concern    Not on file   Social History Narrative    Not on file     Social Determinants of Health     Financial Resource Strain:     Difficulty of Paying Living Expenses:    Food Insecurity:     Worried About Running Out of Food in the Last Year:     [address] in the Last Year:    Transportation Needs:     Lack of Transportation (Medical):  Lack of Transportation (Non-Medical):    Physical Activity:     Days of Exercise per Week:     Minutes of Exercise per Session:    Stress:     Feeling of Stress :    Social Connections:     Frequency of Communication with Friends and Family:     Frequency of Social Gatherings with Friends and Family:     Attends Protestant Services:     Active Member of Clubs or Organizations:     Attends Club or Organization Meetings:     Marital Status:    Intimate Partner Violence:     Fear of Current or Ex-Partner:     Emotionally Abused:     Physically Abused:     Sexually Abused:       No family history on file. There were no vitals taken for this visit. Review of Systems:   A comprehensive review of systems was negative except for that written in the HPI. Neuro Exam:     Appearance: The patient is well developed, well nourished, provides a coherent history and is in no acute distress. Mental Status: Oriented to time, place and person. Mood and affect appropriate. Cranial Nerves:   Intact visual fields. Fundi are benign.  BELLO, EOM's full, no nystagmus, no ptosis. Facial sensation is normal. Corneal reflexes are intact. Facial movement is symmetric. Hearing is normal bilaterally. Palate is midline with normal sternocleidomastoid and trapezius muscles are normal. Tongue is midline. Motor:  5/5 strength in upper and lower proximal and distal muscles. Normal bulk and tone. No fasciculations. Reflexes:   Deep tendon reflexes 2+/4 and symmetrical.   Sensory:   Normal to touch, pinprick and vibration. Gait:  Normal gait. Tremor:   No tremor noted. Cerebellar:  No cerebellar signs present. Neurovascular:  Normal heart sounds and regular rhythm, peripheral pulses intact, and no carotid bruits. Assessment:   Suspect TIA. Glad to see the work-up came out so clean. As far as I am concerned I do not know how we could have avoided the work-up with the preceding history that was obtained on her first visit. In any event should continue with her every other day baby aspirin and follow-up with primary care. Continue with her statin and these tests stand as an important reference point and benchmark for any comparative studies in the future. Is welcome back as needed. Good luck. Plan:   Revisit as needed.   Signed by :  Poncho Tan MD none

## 2021-07-15 NOTE — PATIENT INSTRUCTIONS
Patient worked up for possible TIA expression as outlined on first visit. Collective testing looks fantastic and nothing to worry her about. Out of principal would suggest she continue on her every other day baby aspirin as prior to my work-up. Follow-up with primary care and should do well and these are important test to reference for any future purposes of investigation. Good luck.

## 2022-08-02 ENCOUNTER — OFFICE VISIT (OUTPATIENT)
Dept: ENDOCRINOLOGY | Age: 77
End: 2022-08-02
Payer: MEDICARE

## 2022-08-02 VITALS
BODY MASS INDEX: 21.53 KG/M2 | HEIGHT: 62 IN | DIASTOLIC BLOOD PRESSURE: 70 MMHG | SYSTOLIC BLOOD PRESSURE: 153 MMHG | OXYGEN SATURATION: 99 % | TEMPERATURE: 97.2 F | WEIGHT: 117 LBS | HEART RATE: 67 BPM

## 2022-08-02 DIAGNOSIS — E04.2 MULTINODULAR GOITER: Primary | ICD-10-CM

## 2022-08-02 DIAGNOSIS — M19.012 ARTHRITIS OF LEFT STERNOCLAVICULAR JOINT: ICD-10-CM

## 2022-08-02 PROBLEM — M85.80 OSTEOPENIA: Status: ACTIVE | Noted: 2021-04-16

## 2022-08-02 PROBLEM — H40.9 GLAUCOMA: Status: ACTIVE | Noted: 2021-01-04

## 2022-08-02 PROBLEM — E78.5 HYPERLIPIDEMIA: Status: ACTIVE | Noted: 2021-01-04

## 2022-08-02 PROCEDURE — 1101F PT FALLS ASSESS-DOCD LE1/YR: CPT | Performed by: INTERNAL MEDICINE

## 2022-08-02 PROCEDURE — G8420 CALC BMI NORM PARAMETERS: HCPCS | Performed by: INTERNAL MEDICINE

## 2022-08-02 PROCEDURE — G8427 DOCREV CUR MEDS BY ELIG CLIN: HCPCS | Performed by: INTERNAL MEDICINE

## 2022-08-02 PROCEDURE — G8400 PT W/DXA NO RESULTS DOC: HCPCS | Performed by: INTERNAL MEDICINE

## 2022-08-02 PROCEDURE — G8510 SCR DEP NEG, NO PLAN REQD: HCPCS | Performed by: INTERNAL MEDICINE

## 2022-08-02 PROCEDURE — 99204 OFFICE O/P NEW MOD 45 MIN: CPT | Performed by: INTERNAL MEDICINE

## 2022-08-02 PROCEDURE — 1090F PRES/ABSN URINE INCON ASSESS: CPT | Performed by: INTERNAL MEDICINE

## 2022-08-02 PROCEDURE — 1123F ACP DISCUSS/DSCN MKR DOCD: CPT | Performed by: INTERNAL MEDICINE

## 2022-08-02 PROCEDURE — G8536 NO DOC ELDER MAL SCRN: HCPCS | Performed by: INTERNAL MEDICINE

## 2022-08-02 PROCEDURE — 3017F COLORECTAL CA SCREEN DOC REV: CPT | Performed by: INTERNAL MEDICINE

## 2022-08-02 RX ORDER — FLUTICASONE PROPIONATE 50 MCG
SPRAY, SUSPENSION (ML) NASAL
COMMUNITY

## 2022-08-02 RX ORDER — TIMOLOL MALEATE 5 MG/ML
SOLUTION/ DROPS OPHTHALMIC
COMMUNITY
Start: 2022-06-20

## 2022-08-02 NOTE — PROGRESS NOTES
Arabella Troy MD         Patient Information  Date:8/2/2022  Name : Gonzalo Rios 78 y.o.     YOB: 1945         Referred by: Hilton Chirinos DO         Chief Complaint   Patient presents with    New Patient    Thyroid Problem       History of present illness    Gonzalo Rios is a 78 y.o. female  here for evaluation of thyroid nodule. She noticed left lower  neck swelling, subsequently had thyroid ultrasound which showed multinodular goiter    No dysphagia,dysphonia or dyspnea. No constipation or cold intolerance/heat intolerance  No history of known radiation exposure  No FH of thyroid cancer     Past Medical History:   Diagnosis Date    Hyperlipidemia        Current Outpatient Medications   Medication Sig    fluticasone propionate (FLONASE) 50 mcg/actuation nasal spray fluticasone propionate 50 mcg/actuation nasal spray,suspension   USE 1 SPRAY(S) IN EACH NOSTRIL ONCE DAILY WITH MEALS    timolol (TIMOPTIC) 0.5 % ophthalmic solution INSTILL 1 DROP INTO EACH EYE IN THE MORNING FOR 90 DAYS    multivitamin (ONE A DAY) tablet Take 1 Tablet by mouth daily. aspirin delayed-release 81 mg tablet Take  by mouth daily. alendronate (FOSAMAX) 70 mg tablet Take  by mouth. rosuvastatin (CRESTOR) 20 mg tablet Take 20 mg by mouth nightly. loratadine 10 mg cap Take  by mouth.    latanoprost (XALATAN) 0.005 % ophthalmic solution Administer 1 Drop to both eyes nightly. calcium carbonate (OS-EDOUARD) 500 mg calcium (1,250 mg) tablet Take  by mouth daily. No current facility-administered medications for this visit. Review of Systems:  Per HPI     Physical Examination:  Blood pressure (!) 153/70, pulse 67, temperature 97.2 °F (36.2 °C), temperature source Temporal, height 5' 2\" (1.575 m), weight 117 lb (53.1 kg), SpO2 99 %. Body mass index is 21.4 kg/m².   General: pleasant, no distress, good eye contact  HEENT: no exopthalmos, no periorbital edema, no scleral/conjunctival injection, EOMI, no lid lag or stare  Neck: supple, no thyromegaly, nodules, lymph nodes, she points to left sterno clavicular joint which is prominent, swollen, nontender  Cardiovascular: regular,  normal S1 and S2, no murmurs  Respiratory: clear to auscultation bilaterally  Musculoskeletal: no proximal muscle weakness in upper or lower extremities  Integumentary: no tremors, no edema  Neurological: alert and oriented   Psychiatric: normal mood and affect  Skin - normal turgor    Data Reviewed:   Reviewed PCPs notes, TSH normal 2022  Reviewed ultrasound thyroid  [x] Reviewed labs    Assessment/Plan:     Multinodular goiter    Ultrasound 07/11/2022: Right lobe 4.8 x 1.5 x 1.4 cm, left lobe 4.1 x 1.3 x 1.5 cm, isthmus 0.3 cm    Upper pole right lobe 0.9 x 0.6 x 0.9 isoechoic nodule, few tiny cysts  Left upper lobe 1.8 x 1.2 x 0.7 cm heterogeneously isoechoic solid nodule, lower pole 0.7 x 0.4 x 0.5 cm. No mention of vascularity or microcalcifications. 2022: TSH normal  She is euthyroid clinically and has no compressive symptoms. Patient actually felt was sternoclavicular joint which is swollen. We discussed the natural history of thyroid nodules,most of the nodules are benign and a small percentage ( 5%) can be malignant . Discussed about monitoring versus FNA, risks of thyroid cancer low, she wants to proceed with FNA        Left sternoclavicular joint arthritis / bursitis:    Thank you for allowing me to participate in the care of this patient.     Luis Rodriguez MD

## 2022-08-02 NOTE — LETTER
8/6/2022    Patient: Reagan Aragon   YOB: 1945   Date of Visit: 8/2/2022     Khurram Parsons, [address] [place] [id number]  Via Fax: [phone]     German Thomas, [address] [place] [phone]  Via Fax: [phone]    Dear Khurram Parsons, [address] [place] [address], DO,      Thank you for referring Ms. Trisha Lockhart to [facility] DIABETES & [facility]OCRINOLOGY for evaluation. My notes for this consultation are attached. If you have questions, please do not hesitate to call me. I look forward to following your patient along with you.       Sincerely,    Adilson Call MD

## 2022-08-02 NOTE — PROGRESS NOTES
Identified pt with two pt identifiers. Reviewed record in preparation for visit and have obtained necessary documentation. All patient medications has been reviewed. Chief Complaint   Patient presents with    New Patient    Thyroid Problem     Additional information about chief complaint:    Visit Vitals  BP (!) 153/70 (BP 1 Location: Left upper arm, BP Patient Position: Sitting, BP Cuff Size: Adult)   Pulse 67   Temp 97.2 °F (36.2 °C) (Temporal)   Ht 5' 2\" (1.575 m)   Wt 117 lb (53.1 kg)   SpO2 99%   BMI 21.40 kg/m²       Health Maintenance Due   Topic    Hepatitis C Screening     Depression Screen     COVID-19 Vaccine (1)    Lipid Screen     DTaP/Tdap/Td series (1 - Tdap)    Shingrix Vaccine Age 50> (1 of 2)    Breast Cancer Screen Mammogram     Bone Densitometry (Dexa) Screening     Medicare Yearly Exam        1. Have you been to the ER, urgent care clinic since your last visit? Hospitalized since your last visit? N/a    2. Have you seen or consulted any other health care providers outside of the [address] [place] [address] since your last visit? Include any pap smears or colon screening.  N/a

## 2022-08-30 ENCOUNTER — OFFICE VISIT (OUTPATIENT)
Dept: ENDOCRINOLOGY | Age: 77
End: 2022-08-30
Payer: MEDICARE

## 2022-08-30 VITALS
SYSTOLIC BLOOD PRESSURE: 133 MMHG | HEIGHT: 62 IN | HEART RATE: 75 BPM | OXYGEN SATURATION: 99 % | BODY MASS INDEX: 21.49 KG/M2 | DIASTOLIC BLOOD PRESSURE: 71 MMHG | TEMPERATURE: 97.9 F | WEIGHT: 116.8 LBS

## 2022-08-30 DIAGNOSIS — E04.2 MULTINODULAR GOITER: Primary | ICD-10-CM

## 2022-08-30 PROCEDURE — G8420 CALC BMI NORM PARAMETERS: HCPCS | Performed by: INTERNAL MEDICINE

## 2022-08-30 PROCEDURE — 99212 OFFICE O/P EST SF 10 MIN: CPT | Performed by: INTERNAL MEDICINE

## 2022-08-30 PROCEDURE — G8400 PT W/DXA NO RESULTS DOC: HCPCS | Performed by: INTERNAL MEDICINE

## 2022-08-30 PROCEDURE — G8536 NO DOC ELDER MAL SCRN: HCPCS | Performed by: INTERNAL MEDICINE

## 2022-08-30 PROCEDURE — 10005 FNA BX W/US GDN 1ST LES: CPT | Performed by: INTERNAL MEDICINE

## 2022-08-30 PROCEDURE — G8427 DOCREV CUR MEDS BY ELIG CLIN: HCPCS | Performed by: INTERNAL MEDICINE

## 2022-08-30 PROCEDURE — G8510 SCR DEP NEG, NO PLAN REQD: HCPCS | Performed by: INTERNAL MEDICINE

## 2022-08-30 PROCEDURE — 1090F PRES/ABSN URINE INCON ASSESS: CPT | Performed by: INTERNAL MEDICINE

## 2022-08-30 PROCEDURE — 3017F COLORECTAL CA SCREEN DOC REV: CPT | Performed by: INTERNAL MEDICINE

## 2022-08-30 PROCEDURE — 1123F ACP DISCUSS/DSCN MKR DOCD: CPT | Performed by: INTERNAL MEDICINE

## 2022-08-30 PROCEDURE — 1101F PT FALLS ASSESS-DOCD LE1/YR: CPT | Performed by: INTERNAL MEDICINE

## 2022-08-30 NOTE — PROGRESS NOTES
[facility] DIABETES & ENDOCRINOLOGY  OFFICE PROCEDURE PROGRESS NOTE        Chart reviewed for the following:   Fausto Gooden MD, have reviewed the History, Physical and updated the Allergic reactions for Robson Slade     Procedure performed by:  Nara Albert MD    Assisted by:Fabienne Calderon LPN/Mercy Campbell LPN    TIME OUT performed immediately prior to start of procedure:   Fausto Gooden MD, have performed the following reviews on Robson Slade prior to the start of the procedure:            * Patient was identified by name and date of birth   * Agreement on procedure being performed was verified  * Explained procedure and answered questions  * Procedure site verified and marked as necessary  * Patient was positioned for comfort  * Consent was signed and verified      The risks, benefits and alternatives of ultrasound guided thyroid fine   needle aspiration were discussed with the patient. After all questions were   answered and informed written consent was obtained, patient was prepped  in a supine position. 1% lidocaine was used as local anesthetic. Using sonographic guidance, fine needle aspiration of thyroid  nodule was performed using 25-gauge needles. 5 aspirations were made using 25  G needles  Samples were submitted to cytology. Patient  tolerated procedure well without complications. Advised to keep ice for 30 minutes after going home. After care instructions provided.   Patient was told to expect a call in 2 weeks regarding the results       Pain at the site before procedure 0/10   Pain post procedure  0/10    Reviewed medications, past medical history, allergies      Physical exam  General: pleasant, no distress, good eye contact  HEENT: no exophthalmos, no periorbital edema, EOMI  Neck: Nontender  RS: Normal respiratory effort  Musculoskeletal: no tremors  Neurological: alert and oriented  Psychiatric: normal mood and affect  Skin: Normal color      Assessment/Plan: Multinodular goiter    Ultrasound 07/11/2022: Right lobe 4.8 x 1.5 x 1.4 cm, left lobe 4.1 x 1.3 x 1.5 cm, isthmus 0.3 cm    Upper pole right lobe 0.9 x 0.6 x 0.9 isoechoic nodule, few tiny cysts  Left upper lobe 1.8 x 1.2 x 0.7 cm heterogeneously isoechoic solid nodule, lower pole 0.7 x 0.4 x 0.5 cm. No mention of vascularity or microcalcifications. 2022: TSH normal    FNA today  Discussed the risks,      Left sternoclavicular joint arthritis / bursitis:    Thank you for allowing me to participate in the care of this patient.     Nathan Torres MD

## 2022-08-30 NOTE — PROGRESS NOTES
1. Have you been to the ER, urgent care clinic since your last visit? Hospitalized since your last visit? No    2. Have you seen or consulted any other health care providers outside of the [address] [place] [address] since your last visit? Include any pap smears or colon screening.  No  Chief Complaint   Patient presents with    Thyroid Problem    Other     biopsy     Visit Vitals  /71 (BP 1 Location: Right upper arm, BP Patient Position: Sitting, BP Cuff Size: Adult)   Pulse 75   Temp 97.9 °F (36.6 °C) (Temporal)   Ht 5' 2\" (1.575 m)   Wt 116 lb 12.8 oz (53 kg)   SpO2 99%   BMI 21.36 kg/m²

## 2022-08-30 NOTE — LETTER
8/30/2022    Patient: Collin Crews   YOB: 1945   Date of Visit: 8/30/2022     Ginette Wood, [address] [place] [phone]  Via Fax: [phone]    Dear Ginette Wood DO,      Thank you for referring Ms. James Patiño to [facility] DIABETES & ENDOCRINOLOGY for evaluation. My notes for this consultation are attached. If you have questions, please do not hesitate to call me. I look forward to following your patient along with you.       Sincerely,    Lorraine Nguyễn MD

## 2022-08-30 NOTE — PATIENT INSTRUCTIONS
Call your doctor right away if you have these   Bleeding at the injection site for more than 10 minutes  Redness, warmth, swelling, or drainage at the injection site  Fever of 100.4°F (38.0°C), or higher        Call 911 if you have     Signs of severe allergic reaction (trouble breathing,tongue swelling )

## 2022-09-06 ENCOUNTER — TELEPHONE (OUTPATIENT)
Dept: ENDOCRINOLOGY | Age: 77
End: 2022-09-06

## 2022-09-06 NOTE — TELEPHONE ENCOUNTER
Per Dr. Roland Heads informed pt of the following \"no Thyroid cancer seen\" Pt verbalized understanding with no further questions or concerns at this time.

## 2023-08-29 ENCOUNTER — PROCEDURE VISIT (OUTPATIENT)
Age: 78
End: 2023-08-29

## 2023-08-29 VITALS
WEIGHT: 116 LBS | TEMPERATURE: 98.5 F | HEART RATE: 64 BPM | RESPIRATION RATE: 16 BRPM | HEIGHT: 62 IN | DIASTOLIC BLOOD PRESSURE: 56 MMHG | SYSTOLIC BLOOD PRESSURE: 130 MMHG | BODY MASS INDEX: 21.35 KG/M2 | OXYGEN SATURATION: 100 %

## 2023-08-29 DIAGNOSIS — E04.2 NONTOXIC MULTINODULAR GOITER: ICD-10-CM

## 2023-08-29 NOTE — PROGRESS NOTES
Brandi Mcdonald MD            Patient Information   Date:8/2/2022   Name : Liudmila Zaragoza 78 y.o.      YOB: 1945           Referred by: Xiomy Street DO                  History of present illness      Liudmila Zaragoza is a 78 y.o. female  here for follow-up of thyroid nodule. She noticed left lower  neck swelling, subsequently had thyroid ultrasound which showed multinodular goiter in 2022, FNA of the left thyroid nodule was benign in 2022  She has not noticed any change in the size of the neck, no dysphagia                 Physical Examination:      General: pleasant, no distress, good eye contact    HEENT: no exopthalmos, no periorbital edema, no scleral/conjunctival injection, EOMI, no lid lag or stare    Neck: supple, no thyromegaly,     Cardiovascular: regular,  normal S1 and S2, no murmurs    Respiratory: clear to auscultation bilaterally    Musculoskeletal: no proximal muscle weakness in upper or lower extremities    Integumentary: no tremors, no edema    Neurological: alert and oriented     Psychiatric: normal mood and affect    Skin - normal turgor      Data Reviewed:      [x]  Reviewed labs      Assessment/Plan:       Multinodular goiter    Ultrasound 07/11/2022: Right lobe 4.8 x 1.5 x 1.4 cm, left lobe 4.1 x 1.3 x 1.5 cm, isthmus 0.3 cm    Upper pole right lobe 0.9 x 0.6 x 0.9 isoechoic nodule, few  tiny cysts  Left upper lobe 1.8 x 1.2 x 0.7 cm heterogeneously isoechoic solid nodule, lower pole 0.7 x 0.4 x 0.5 cm. No mention of vascularity or microcalcifications. 2022: TSH normal  She opted FNA of the left upper nodule: Benign in 2022  Ultrasound 2023: Nodules are stable in size right thyroid nodule 0.9 cm, left thyroid nodule 1.4 cm            Left sternoclavicular joint arthritis / bursitis: Better      Thank you for allowing me to participate in the care of this patient.       Ofelia Pace MD

## 2023-08-29 NOTE — PROGRESS NOTES
Gricel Peters is a 78 y.o. female here for   Chief Complaint   Patient presents with    Thyroid Problem    Ultrasound       1. Have you been to the ER, urgent care clinic since your last visit? Hospitalized since your last visit? -no    2. Have you seen or consulted any other health care providers outside of the [address] [place] [address] Avenue since your last visit? Include any pap smears or colon screening. -PCP

## 2023-08-30 LAB
T4 FREE SERPL-MCNC: 1 NG/DL (ref 0.8–1.5)
TSH SERPL DL<=0.05 MIU/L-ACNC: 1.25 UIU/ML (ref 0.36–3.74)

## 2023-08-31 LAB — T3 SERPL-MCNC: 100 NG/DL (ref 71–180)

## 2023-09-01 NOTE — PROGRESS NOTES
Thyroid Ultrasound Report    Patient Information  Date:9/1/2023  Name : Kandice Jenkins 78 y.o.     YOB: 1945         Referred by: Catie Schaefer DO, MD        Multiple real time longitudinal and transverse images were obtained using a high  resolution ultrasound with a Linear transducer. Right thyroid lobe measures 3 x 1.3 x 1.2 cm. Left lobe measures 3.7 x 1.3 x 1.4 cm. Isthmus measures 0.3 cm    Right lobe has a subcentimeter nodule measuring 0.9 x 0.9 x 0.6 cm with no microcalcifications. Left lobe has a nodule measuring 1.4 x 1.3 x 0.9 cm with no increased vascularity, no microcalcifications, this nodule was biopsied in 2022, there is another nodule in the superior pole not clearly defined measuring 0.7 x 0.8 x 0.4 cm. Impression:    Multinodular goiter. Left thyroid nodule 1.4 cm in the right thyroid nodule 0.9 cm which has been stable in size.      Siddhartha Keene MD

## 2024-05-01 ENCOUNTER — HOSPITAL ENCOUNTER (OUTPATIENT)
Facility: HOSPITAL | Age: 79
Discharge: HOME | End: 2024-05-03
Payer: MEDICARE

## 2024-05-01 DIAGNOSIS — I73.9 PVD (PERIPHERAL VASCULAR DISEASE) (HCC): ICD-10-CM

## 2024-05-01 PROCEDURE — 93922 UPR/L XTREMITY ART 2 LEVELS: CPT

## 2024-05-02 LAB
VAS LEFT ABI: 0.97
VAS LEFT ARM BP: 156 MMHG
VAS LEFT DORSALIS PEDIS BP: 158 MMHG
VAS LEFT PTA BP: 158 MMHG
VAS LEFT TBI: 0.61
VAS LEFT TOE PRESSURE: 99 MMHG
VAS RIGHT ABI: 1.06
VAS RIGHT ARM BP: 163 MMHG
VAS RIGHT DORSALIS PEDIS BP: 173 MMHG
VAS RIGHT PTA BP: 165 MMHG
VAS RIGHT TBI: 0.67
VAS RIGHT TOE PRESSURE: 110 MMHG

## 2024-05-02 PROCEDURE — 93922 UPR/L XTREMITY ART 2 LEVELS: CPT | Performed by: SURGERY

## 2024-05-19 PROBLEM — I73.9 PVD (PERIPHERAL VASCULAR DISEASE) (HCC): Status: ACTIVE | Noted: 2024-05-19

## 2024-09-02 ENCOUNTER — HOSPITAL ENCOUNTER (EMERGENCY)
Facility: HOSPITAL | Age: 79
Discharge: HOME | End: 2024-09-02
Attending: FAMILY MEDICINE
Payer: MEDICARE

## 2024-09-02 VITALS
HEART RATE: 65 BPM | SYSTOLIC BLOOD PRESSURE: 137 MMHG | HEIGHT: 62 IN | TEMPERATURE: 98.9 F | RESPIRATION RATE: 18 BRPM | WEIGHT: 116 LBS | DIASTOLIC BLOOD PRESSURE: 69 MMHG | OXYGEN SATURATION: 97 % | BODY MASS INDEX: 21.35 KG/M2

## 2024-09-02 DIAGNOSIS — J06.9 URI WITH COUGH AND CONGESTION: Primary | ICD-10-CM

## 2024-09-02 DIAGNOSIS — U07.1 COVID: ICD-10-CM

## 2024-09-02 LAB
FLUAV RNA SPEC QL NAA+PROBE: NOT DETECTED
FLUBV RNA SPEC QL NAA+PROBE: NOT DETECTED
SARS-COV-2 RNA RESP QL NAA+PROBE: DETECTED

## 2024-09-02 PROCEDURE — 99283 EMERGENCY DEPT VISIT LOW MDM: CPT

## 2024-09-02 PROCEDURE — 87636 SARSCOV2 & INF A&B AMP PRB: CPT

## 2024-09-02 PROCEDURE — 93005 ELECTROCARDIOGRAM TRACING: CPT | Performed by: FAMILY MEDICINE

## 2024-09-02 ASSESSMENT — PAIN - FUNCTIONAL ASSESSMENT: PAIN_FUNCTIONAL_ASSESSMENT: NONE - DENIES PAIN

## 2024-09-02 NOTE — ED PROVIDER NOTES
EMERGENCY DEPARTMENT HISTORY AND PHYSICAL EXAM      Date: 9/2/2024  Patient Name: Linda Zayas    History of Presenting Illness         History Provided By:     HPI: Linda Zayas, is a very pleasant 78 y.o. female presenting to the ED with a chief complaint of COVID exposure, cough, congestion, sore throat, palpitations.  States a family member just tested positive for COVID and they were in close contact.  Over the last 2 days she has been intermittently coughing with congestion.  Mild sore throat which has nearly resolved.  This morning around 4 AM she states she had an episode of palpitations which has resolved.  No chest pain or shortness of breath.  No palpitations.    Denies any other symptoms at this time.    PCP: Siegrist, Marianne L, DO    No current facility-administered medications on file prior to encounter.     Current Outpatient Medications on File Prior to Encounter   Medication Sig Dispense Refill    aspirin 81 MG EC tablet Take by mouth daily      alendronate (FOSAMAX) 70 MG tablet Take by mouth      calcium carbonate (OYSTER SHELL CALCIUM 500 MG) 1250 (500 Ca) MG tablet Take by mouth daily      fluticasone (FLONASE) 50 MCG/ACT nasal spray fluticasone propionate 50 mcg/actuation nasal spray,suspension   USE 1 SPRAY(S) IN EACH NOSTRIL ONCE DAILY WITH MEALS      latanoprost (XALATAN) 0.005 % ophthalmic solution Apply 1 drop to eye      loratadine (CLARITIN) 10 MG capsule Take 1 capsule by mouth daily      rosuvastatin (CRESTOR) 20 MG tablet Take 1 tablet by mouth nightly      timolol (TIMOPTIC) 0.5 % ophthalmic solution INSTILL 1 DROP INTO EACH EYE IN THE MORNING FOR 90 DAYS         Past History     Past Medical History:  Past Medical History:   Diagnosis Date    Hyperlipidemia        Past Surgical History:  Past Surgical History:   Procedure Laterality Date    COLONOSCOPY Left 2/19/2021    COLONOSCOPY performed by Manohar Evans MD at [facility] ENDOSCOPY    HYSTERECTOMY (CERVIX STATUS UNKNOWN)

## 2024-09-02 NOTE — DISCHARGE INSTRUCTIONS
Thank you for choosing our Emergency Department for your care.  It is our privilege to care for you in your time of need.  In the next several days, you may receive a survey via email or mailed to your home about your experience with our team.  We would greatly appreciate you taking a few minutes to complete the survey, as we use this information to learn what we have done well and what we could be doing better. Thank you for trusting us with your care!    Below you will find a list of your tests from today's visit.   Labs  Recent Results (from the past 12 hour(s))   COVID-19 & Influenza Combo    Collection Time: 09/02/24 10:45 AM    Specimen: Nasopharyngeal   Result Value Ref Range    SARS-CoV-2, PCR DETECTED (A) Not Detected      Rapid Influenza A By PCR Not Detected Not Detected      Rapid Influenza B By PCR Not Detected Not Detected         Radiologic Studies  No orders to display     ------------------------------------------------------------------------------------------------------------  The evaluation and treatment you received in the Emergency Department were for an urgent problem. It is important that you follow-up with a doctor, nurse practitioner, or physician assistant to:  (1) confirm your diagnosis,  (2) re-evaluation of changes in your illness and treatment, and (3) for ongoing care. Please take your discharge instructions with you when you go to your follow-up appointment.     If you have any problem arranging a follow-up appointment, contact us!  If your symptoms become worse or you do not improve as expected, please return to us. We are available 24 hours a day.     If a prescription has been provided, please fill it as soon as possible to prevent a delay in treatment. If you have any questions or reservations about taking the medication due to side effects or interactions with other medications, please call your primary care provider or contact us directly.  Again, THANK YOU for choosing us

## 2024-09-02 NOTE — ED TRIAGE NOTES
Pt with c/o headache and cough, woke up with heart palpitations this morning. Family members at home have tested positive for covid

## 2024-09-05 LAB
EKG ATRIAL RATE: 70 BPM
EKG DIAGNOSIS: NORMAL
EKG P AXIS: 71 DEGREES
EKG P-R INTERVAL: 122 MS
EKG Q-T INTERVAL: 402 MS
EKG QRS DURATION: 130 MS
EKG QTC CALCULATION (BAZETT): 434 MS
EKG R AXIS: 56 DEGREES
EKG T AXIS: 30 DEGREES
EKG VENTRICULAR RATE: 70 BPM

## 2024-12-20 ENCOUNTER — TRANSCRIBE ORDERS (OUTPATIENT)
Facility: HOSPITAL | Age: 79
End: 2024-12-20

## 2024-12-20 DIAGNOSIS — Z12.31 ENCOUNTER FOR SCREENING MAMMOGRAM FOR MALIGNANT NEOPLASM OF BREAST: Primary | ICD-10-CM

## 2025-01-10 ENCOUNTER — HOSPITAL ENCOUNTER (OUTPATIENT)
Facility: HOSPITAL | Age: 80
Discharge: HOME | End: 2025-01-13
Payer: MEDICARE

## 2025-01-10 DIAGNOSIS — Z12.31 ENCOUNTER FOR SCREENING MAMMOGRAM FOR MALIGNANT NEOPLASM OF BREAST: ICD-10-CM

## 2025-01-10 PROCEDURE — 77063 BREAST TOMOSYNTHESIS BI: CPT

## (undated) DEVICE — FORCEPS BX L240CM JAW DIA2.8MM L CAP W/ NDL MIC MESH TOOTH